# Patient Record
Sex: FEMALE | Race: BLACK OR AFRICAN AMERICAN
[De-identification: names, ages, dates, MRNs, and addresses within clinical notes are randomized per-mention and may not be internally consistent; named-entity substitution may affect disease eponyms.]

---

## 2018-01-10 ENCOUNTER — HOSPITAL ENCOUNTER (EMERGENCY)
Dept: HOSPITAL 62 - ER | Age: 23
Discharge: HOME | End: 2018-01-10
Payer: SELF-PAY

## 2018-01-10 VITALS — DIASTOLIC BLOOD PRESSURE: 78 MMHG | SYSTOLIC BLOOD PRESSURE: 106 MMHG

## 2018-01-10 DIAGNOSIS — N89.8: ICD-10-CM

## 2018-01-10 DIAGNOSIS — J11.1: Primary | ICD-10-CM

## 2018-01-10 DIAGNOSIS — R51: ICD-10-CM

## 2018-01-10 DIAGNOSIS — R19.7: ICD-10-CM

## 2018-01-10 DIAGNOSIS — R05: ICD-10-CM

## 2018-01-10 DIAGNOSIS — F17.200: ICD-10-CM

## 2018-01-10 DIAGNOSIS — R50.9: ICD-10-CM

## 2018-01-10 DIAGNOSIS — R11.10: ICD-10-CM

## 2018-01-10 LAB
A TYPE INFLUENZA AG: POSITIVE
APPEARANCE UR: (no result)
APTT PPP: YELLOW S
B INFLUENZA AG: NEGATIVE
BACTERIA (WET MOUNT): (no result)
BILIRUB UR QL STRIP: NEGATIVE
CHLAM PCR: NOT DETECTED
EPITHELIALS (WET MOUNT): (no result)
GLUCOSE UR STRIP-MCNC: NEGATIVE MG/DL
GON PCR: NOT DETECTED
KETONES UR STRIP-MCNC: 20 MG/DL
NITRITE UR QL STRIP: NEGATIVE
PH UR STRIP: 5 [PH] (ref 5–9)
PROT UR STRIP-MCNC: NEGATIVE MG/DL
SP GR UR STRIP: 1.01
T.VAGINALIS (WET MOUNT): (no result)
UROBILINOGEN UR-MCNC: 2 MG/DL (ref ?–2)
WBCS (WET MOUNT): (no result)
YEAST (WET MOUNT): (no result)

## 2018-01-10 PROCEDURE — 87086 URINE CULTURE/COLONY COUNT: CPT

## 2018-01-10 PROCEDURE — 81025 URINE PREGNANCY TEST: CPT

## 2018-01-10 PROCEDURE — 87591 N.GONORRHOEAE DNA AMP PROB: CPT

## 2018-01-10 PROCEDURE — 87210 SMEAR WET MOUNT SALINE/INK: CPT

## 2018-01-10 PROCEDURE — 99283 EMERGENCY DEPT VISIT LOW MDM: CPT

## 2018-01-10 PROCEDURE — 81001 URINALYSIS AUTO W/SCOPE: CPT

## 2018-01-10 PROCEDURE — 71046 X-RAY EXAM CHEST 2 VIEWS: CPT

## 2018-01-10 PROCEDURE — 87804 INFLUENZA ASSAY W/OPTIC: CPT

## 2018-01-10 PROCEDURE — 87491 CHLMYD TRACH DNA AMP PROBE: CPT

## 2018-01-10 NOTE — ER DOCUMENT REPORT
ED Flu Like





- General


Chief Complaint: Flu Symptoms


Stated Complaint: FLU LIKE SYMPTOMS


Time Seen by Provider: 01/10/18 07:57


Notes: 





Patient is a 22-year-old female presents emergency department complaining of 

cough on and off for the past week with symptoms worsening approximately 2-3 

days ago.  She admits to initial dry cough and then over the past couple of 

days she admits to cough, congestion body aches and headache.  Patient states 

that she did not get a flu vaccine this year.  Mom had flu within the month.  

She also admits today that she has had some vaginal discharge that she wants to 

be worked up for an STD.  She denies any pelvic pain, vaginal pain, pyuria, 

hematuria. 





Patient is a half a pack a day smoker, admits to social etoh


TRAVEL OUTSIDE OF THE [place] IN LAST 30 DAYS: No





- Related Data


Allergies/Adverse Reactions: 


 





No Known Allergies Allergy (Verified 01/10/18 07:01)


 











Past Medical History





- Social History


Smoking Status: Current Every Day Smoker


Family History: Reviewed & Not Pertinent


Past Surgical History: Reports: Hx Appendectomy





Review of Systems





- Review of Systems


Constitutional: See HPI


EENT: See HPI


Cardiovascular: No symptoms reported


Respiratory: See HPI


Gastrointestinal: No symptoms reported


Genitourinary: No symptoms reported


Female Genitourinary: See HPI


-: Yes All other systems reviewed and negative





Physical Exam





- Vital signs


Vitals: 


 











Temp Pulse Resp BP Pulse Ox


 


 99.0 F   84   17   108/66   97 


 


 01/10/18 06:54  01/10/18 06:54  01/10/18 06:54  01/10/18 06:54  01/10/18 06:54














- Notes


Notes: 





PHYSICAL EXAM


GENERAL: Alert, interacts well. 


HEAD: Normocephalic, atraumatic.


EYES: Pupils equal, round, and reactive to light. Extraocular movements intact.


ENT: Oral mucosa moist, tongue midline. 


NECK: Full range of motion. Supple. Trachea midline.


LUNGS: Clear to auscultation bilaterally, no wheezes, rales, or rhonchi. No 

respiratory distress.


HEART: Regular rate and rhythm. No murmurs, gallops, or rubs.


ABDOMEN: Soft,  nondistended, nontender. No guarding, rebound, or rigidity.. 

Bowel sounds present in all 4 quadrants.


Female exam; patient declined


EXTREMITIES: Moves all 4 extremities spontaneously. No edema, radial and 

dorsalis pedis pulses 2/4 bilaterally. No cyanosis. 


NEUROLOGICAL: Alert and oriented x4. Normal speech.


PSYCH: Normal affect, normal mood.


SKIN: Warm, dry, normal turgor. No rashes or lesions noted.








Course





- Re-evaluation


Re-evalutation: 





01/10/18 09:20


Patient presents with cough, vomiting, diarrhea, and fever at home consistent 

with a diagnosis of influenza.  Influenza testing is positive.  Patient is 

overall well in appearance, in no acute distress.  Lung sounds clear.  Able to 

tolerate oral intake without difficulty here in the emergency department.  

After risks and benefits conversation with the patient regarding the use of 

Tamiflu, they have elected to use supportive care without Tamiflu based on 

concerns about lack of efficacy as well as the side effect profile.  Regarding 

vaginal discharge, wet mount does not show any evidence of BV, trichomonas or 

yeast infection.  Chlamydia and gonorrhea are pending.  Urinalysis clean.  

Patient declining prophylactic treatment at this time.  At this time will 

discharge with return precautions and follow-up recommendations.  Verbal 

discharge instructions given a the bedside and opportunity for questions given. 

Medication warnings reviewed. Patient is in agreement with this plan and has 

verbalized understanding of return precautions and the need for primary care 

follow-up in the next 24-72 hours.











- Vital Signs


Vital signs: 


 











Temp Pulse Resp BP Pulse Ox


 


 98.7 F   75   16   106/78   96 


 


 01/10/18 09:27  01/10/18 09:27  01/10/18 09:27  01/10/18 09:27  01/10/18 09:27














- Laboratory


Laboratory results interpreted by me: 


 











  01/10/18





  08:35


 


Urine Ketones  20 H


 


Urine Urobilinogen  2.0 H


 


Ur Leukocyte Esterase  TRACE H














Discharge





- Discharge


Clinical Impression: 


 Influenza A





Condition: Good


Disposition: HOME, SELF-CARE


Additional Instructions: 


You have influenza.  There is no treatment that is effective for this diagnosis 

other than supportive care at home. This includes drinking plenty of fluids, 

using Tylenol or ibuprofen as needed for fever and discomfort.  Please follow 

closely with you primary care physician the next 1-2 days regarding this 

diagnosis.  Return to the emergency department immediately if you began to have 

persistent vomiting prevents you from being able to keep fluids down for more 

than 12 hours, you pass out, you began having difficulty breathing, you become 

confused, or you have any other symptoms that are 


Forms:  Return to Work

## 2018-01-10 NOTE — RADIOLOGY REPORT (SQ)
EXAM DESCRIPTION:  CHEST PA/LAT



COMPLETED DATE/TIME:  1/10/2018 8:27 am



REASON FOR STUDY:  cough



COMPARISON:  None.



TECHNIQUE:  Frontal and lateral radiographic views of the chest acquired.



NUMBER OF VIEWS:  Two view.



LIMITATIONS:  None.



FINDINGS:  LUNGS AND PLEURA: No opacities, masses or pneumothorax. No pleural effusion.

MEDIASTINUM AND HILAR STRUCTURES: No masses or contour abnormalities.

HEART AND VASCULAR STRUCTURES: Heart normal size.  No evidence for failure.

BONES: No acute findings.

HARDWARE: None in the chest.

OTHER: No other significant finding.



IMPRESSION:  NO SIGNIFICANT RADIOGRAPHIC FINDING IN THE CHEST.



TECHNICAL DOCUMENTATION:  JOB ID:  [id number]

 2011 [employer]- All Rights Reserved

## 2018-08-29 ENCOUNTER — HOSPITAL ENCOUNTER (EMERGENCY)
Dept: HOSPITAL 62 - ER | Age: 23
Discharge: HOME | End: 2018-08-29
Payer: SELF-PAY

## 2018-08-29 VITALS — DIASTOLIC BLOOD PRESSURE: 68 MMHG | SYSTOLIC BLOOD PRESSURE: 118 MMHG

## 2018-08-29 DIAGNOSIS — S53.401A: Primary | ICD-10-CM
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
## 2018-08-29 DIAGNOSIS — F17.200: ICD-10-CM

## 2018-08-29 PROCEDURE — 99283 EMERGENCY DEPT VISIT LOW MDM: CPT
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
